# Patient Record
Sex: MALE | Race: ASIAN | NOT HISPANIC OR LATINO | Employment: UNEMPLOYED | URBAN - METROPOLITAN AREA
[De-identification: names, ages, dates, MRNs, and addresses within clinical notes are randomized per-mention and may not be internally consistent; named-entity substitution may affect disease eponyms.]

---

## 2022-08-15 ENCOUNTER — HOSPITAL ENCOUNTER (EMERGENCY)
Facility: HOSPITAL | Age: 22
Discharge: HOME | End: 2022-08-15
Admitting: NURSE PRACTITIONER
Payer: COMMERCIAL

## 2022-08-15 VITALS
HEART RATE: 75 BPM | OXYGEN SATURATION: 97 % | SYSTOLIC BLOOD PRESSURE: 99 MMHG | TEMPERATURE: 98.4 F | WEIGHT: 116 LBS | DIASTOLIC BLOOD PRESSURE: 67 MMHG | BODY MASS INDEX: 19.81 KG/M2 | RESPIRATION RATE: 16 BRPM | HEIGHT: 64 IN

## 2022-08-15 DIAGNOSIS — H72.92 EAR DRUM PERFORATION, LEFT: ICD-10-CM

## 2022-08-15 DIAGNOSIS — H92.02 OTALGIA, LEFT: ICD-10-CM

## 2022-08-15 PROCEDURE — 99283 EMERGENCY DEPT VISIT LOW MDM: CPT

## 2022-08-15 RX ORDER — OFLOXACIN 3 MG/ML
5 SOLUTION AURICULAR (OTIC) 2 TIMES DAILY
Qty: 4 ML | Refills: 0 | Status: SHIPPED | OUTPATIENT
Start: 2022-08-15 | End: 2022-08-22

## 2022-08-15 ASSESSMENT — ENCOUNTER SYMPTOMS
FEVER: 0
CHILLS: 0
SORE THROAT: 0

## 2022-08-15 ASSESSMENT — ACTIVITIES OF DAILY LIVING (ADL): ADLS_ACUITY_SCORE: 35

## 2022-08-16 NOTE — DISCHARGE INSTRUCTIONS
There is a hole in your left ear drum.    Use the antibiotic drops as prescribed.    Keep water out of your left ear. No more Q tips.    Follow up with ENT for ongoing care. Call as soon as possible to schedule this appointment.

## 2022-08-16 NOTE — ED TRIAGE NOTES
Pt had has intermittent L ear pain, itchiness, and drainage over the past 2 months. Pt comes in today d/t pain.

## 2022-08-16 NOTE — ED PROVIDER NOTES
EMERGENCY DEPARTMENT ENCOUNTER      NAME: Sindy Castrejon  AGE: 22 year old male  YOB: 2000  MRN: [id number]  EVALUATION DATE & TIME: 8/15/2022 10:31 PM    PCP: No primary care provider on file.    ED PROVIDER: ROBERT Hayes, CNP      Chief Complaint   Patient presents with     Otalgia         FINAL IMPRESSION:  1. Otalgia, left    2. Ear drum perforation, left          ED COURSE & MEDICAL DECISION MAKING:    10:36 PM I met with the patient, obtained history, performed an initial exam, and discussed options and plan for treatment here in the ED.    Pertinent Labs & Imaging studies reviewed. (See chart for details)  22 year old male presents to the Emergency Department for evaluation of left ear pain. Pain for several months. Has large perforation of the left TM on exam. Does use Q tips so potentially from trauma. Noting appears grossly infected. Will start ofloxacin drops and refer to ENT. Advised to keep water out of the left ear. No more Q tips. Also provided with return precautions.     At the conclusion of the encounter I discussed the results of all of the tests and the disposition. The questions were answered. The patient or family acknowledged understanding and was agreeable with the care plan.       PPE worn: n95 mask, goggles.     MEDICATIONS GIVEN IN THE EMERGENCY:  Medications - No data to display    NEW PRESCRIPTIONS STARTED AT TODAY'S ER VISIT  New Prescriptions    OFLOXACIN (FLOXIN) 0.3 % OTIC SOLUTION    Place 5 drops Into the left ear 2 times daily for 7 days            =================================================================    HPI    Patient information was obtained from: patient     Use of Intrepreter: N/A       Sindy Castrejon is a 22 year old male otherwise healthy who presents for evaluation of ear pain.    Patient reports left ear pain which began 4 months ago. Reports occasional yellow drainage and bleeding from the left ear. Symptoms have been ongoing, presents to  "the ED tonight due to increased pain. Patient frequently cleans his ears with Q-tips. No right ear pain or drainage. No fever, chills, sore throat, or any other complaints at this time. Denies any issues with his ears prior to 4 months ago. Does not take any medications nor did he try any meds for his pain. No other complaints or concerns expressed at this time.      REVIEW OF SYSTEMS   Review of Systems   Constitutional: Negative for chills and fever.   HENT: Positive for ear discharge (left) and ear pain (left). Negative for sore throat.    All other systems reviewed and are negative.       PAST MEDICAL HISTORY:  History reviewed. No pertinent past medical history.    PAST SURGICAL HISTORY:  History reviewed. No pertinent surgical history.        CURRENT MEDICATIONS:    No current facility-administered medications for this encounter.     Current Outpatient Medications   Medication     ofloxacin (FLOXIN) 0.3 % otic solution         ALLERGIES:  No Known Allergies    FAMILY HISTORY:  History reviewed. No pertinent family history.    SOCIAL HISTORY:   Social History     Socioeconomic History     Marital status: Single     Spouse name: None     Number of children: None     Years of education: None     Highest education level: None         VITALS:  Patient Vitals for the past 24 hrs:   BP Temp Pulse Resp SpO2 Height Weight   08/15/22 2229 110/67 98.4  F (36.9  C) 75 16 100 % 1.626 m (5' 4\") 52.6 kg (116 lb)       PHYSICAL EXAM    Constitutional:  Well developed, well nourished, no acute distress  EYES: Conjunctivae clear  HENT:  Atraumatic, normocephalic. Right TM and auditory canal appear normal. Left auditory canal is normal appearing. Large inferior TM perforation on the left. No drainage.   Respiratory:  No respiratory distress  Integument: Warm, Dry  Neurologic:  Alert & oriented x 3              Chris MARTINEZ, am serving as a scribe to document services personally performed by Earl Reis CNP. based on my " observation and the provider's statements to me. I, Earl Reis CNP attest that Chris Cavanaugh is acting in a scribe capacity, has observed my performance of the services and has documented them in accordance with my direction.    ROBERT Hayes, CNP  Emergency Medicine  [facility] EMERGENCY DEPARTMENT  [address] [place] [id number]  [phone]  Dept: [phone]         Earl Reis APRN CNP  08/15/22 9064

## 2022-10-03 ENCOUNTER — HEALTH MAINTENANCE LETTER (OUTPATIENT)
Age: 22
End: 2022-10-03

## 2022-10-17 DIAGNOSIS — H69.90 DYSFUNCTION OF EUSTACHIAN TUBE, UNSPECIFIED LATERALITY: Primary | ICD-10-CM

## 2023-10-22 ENCOUNTER — HEALTH MAINTENANCE LETTER (OUTPATIENT)
Age: 23
End: 2023-10-22

## 2024-04-11 ENCOUNTER — HOSPITAL ENCOUNTER (EMERGENCY)
Facility: HOSPITAL | Age: 24
Discharge: HOME | End: 2024-04-11
Attending: STUDENT IN AN ORGANIZED HEALTH CARE EDUCATION/TRAINING PROGRAM | Admitting: STUDENT IN AN ORGANIZED HEALTH CARE EDUCATION/TRAINING PROGRAM
Payer: COMMERCIAL

## 2024-04-11 VITALS
SYSTOLIC BLOOD PRESSURE: 116 MMHG | BODY MASS INDEX: 21.03 KG/M2 | DIASTOLIC BLOOD PRESSURE: 75 MMHG | TEMPERATURE: 98.3 F | RESPIRATION RATE: 18 BRPM | WEIGHT: 122.5 LBS | OXYGEN SATURATION: 100 % | HEART RATE: 75 BPM

## 2024-04-11 DIAGNOSIS — K01.1 IMPACTED MOLAR: ICD-10-CM

## 2024-04-11 DIAGNOSIS — K08.89 PAIN, DENTAL: ICD-10-CM

## 2024-04-11 PROCEDURE — 64400 NJX AA&/STRD TRIGEMINAL NRV: CPT

## 2024-04-11 PROCEDURE — 250N000009 HC RX 250

## 2024-04-11 PROCEDURE — 99283 EMERGENCY DEPT VISIT LOW MDM: CPT | Mod: 25

## 2024-04-11 RX ORDER — BUPIVACAINE HYDROCHLORIDE AND EPINEPHRINE 5; 5 MG/ML; UG/ML
1.8 INJECTION, SOLUTION PERINEURAL ONCE
Status: COMPLETED | OUTPATIENT
Start: 2024-04-11 | End: 2024-04-11

## 2024-04-11 RX ORDER — PENICILLIN V POTASSIUM 500 MG/1
500 TABLET, FILM COATED ORAL 4 TIMES DAILY
Qty: 40 TABLET | Refills: 0 | Status: SHIPPED | OUTPATIENT
Start: 2024-04-11 | End: 2024-04-21

## 2024-04-11 RX ADMIN — BUPIVACAINE HYDROCHLORIDE AND EPINEPHRINE BITARTRATE 1.8 ML: 5; .005 INJECTION, SOLUTION SUBCUTANEOUS at 17:05

## 2024-04-11 ASSESSMENT — ACTIVITIES OF DAILY LIVING (ADL): ADLS_ACUITY_SCORE: 35

## 2024-04-11 ASSESSMENT — COLUMBIA-SUICIDE SEVERITY RATING SCALE - C-SSRS
1. IN THE PAST MONTH, HAVE YOU WISHED YOU WERE DEAD OR WISHED YOU COULD GO TO SLEEP AND NOT WAKE UP?: NO
2. HAVE YOU ACTUALLY HAD ANY THOUGHTS OF KILLING YOURSELF IN THE PAST MONTH?: NO
6. HAVE YOU EVER DONE ANYTHING, STARTED TO DO ANYTHING, OR PREPARED TO DO ANYTHING TO END YOUR LIFE?: NO

## 2024-04-11 NOTE — ED TRIAGE NOTES
Pt reports left sided dental pain for two days. Says it hurts when he chews and is worse around [phone].      Triage Assessment (Adult)       Row Name 04/11/24 2144          Triage Assessment    Airway WDL WDL        Respiratory WDL    Respiratory WDL WDL        Skin Circulation/Temperature WDL    Skin Circulation/Temperature WDL WDL        Cardiac WDL    Cardiac WDL WDL        Peripheral/Neurovascular WDL    Peripheral Neurovascular WDL WDL        Cognitive/Neuro/Behavioral WDL    Cognitive/Neuro/Behavioral WDL WDL

## 2024-04-11 NOTE — ED PROVIDER NOTES
Emergency Department Encounter   NAME: Sindy Castrejon ; AGE: 23 year old male ; YOB: 2000 ; MRN: [id number] ; PCP: No Ref-Primary, Physician   ED PROVIDER: Audrey Acosta PA-C    Evaluation Date & Time:   4/11/2024  4:39 PM    CHIEF COMPLAINT:  Dental Pain      IMPRESSION AND PLAN   MDM: Sindy Castrejon is a 23 year old male with no pertinent history who presents to the ED by car for evaluation of dental pain.  Patient reports that 2 days ago he began to experience this dental pain, denies trauma or other inciting event previous to the onset of pain.  Patient notes chewing, swallowing, opening his mouth aggravate the pain.  He denies radiation of pain.  He reports that he has been taking ibuprofen for pain management, most recently this morning, without relief.  Patient does not follow-up regularly with a dentist.  Patient denies associated symptoms such as fever, chills, cough, chest pain, shortness of breath, diarrhea, constipation, abdominal pain, urinary changes.  Patient denies history of dental/oral surgeries.    Vitals stable. On exam patient is well-appearing.  Oral exam is notable for gingival tissue swelling on the left side of the mouth, covering his most posterior molar.  No broken teeth or dental caries noted.  No pharyngeal erythema or irritation noted.  Uvula is midline.  No mental or neck swelling.  No pain on palpation to mandible, left ear.  No overlying left-sided skin changes on the face or neck.  Shared decision-making was used to determine that no further lab work or imaging was needed at this time.  Symptoms and work up most consistent with impacted wisdom tooth. I considered, but think unlikely, Luis Carlos's angina, peritonsillar abscess, dental abscess, broken tooth, cavities because of reassuring physical exam. Patient was given a dental block for symptoms. Upon reevaluation, patient noticed immediate pain relief.    Patient discharged in improved condition but instructed to return  to the emergency department with any new or worsening of symptoms. Patient was discharged with antibiotic prescription. Patient was educated on dental health and provided informational resources as well as local dental clinics. Patient expressed understanding, feels comfortable, and is in agreement with this plan. All questions addressed prior to discharge.    Patient seen in conjunction with Dr. Mark.    History:  Supplemental history from: Documented in chart  External Record(s) reviewed: Documented in chart    Work Up:  Chart documentation includes differential considered and any EKGs or imaging independently interpreted by provider, where specified.  In additional to work up documented, I considered the following work up: Documented in chart, if applicable.    External consultation:  Discussion of management with another provider: Documented in chart, if applicable    Complicating factors:  Care impacted by chronic illness: N/A  Care affected by social determinants of health: N/A    Disposition considerations: Discharge. No recommendations on prescription strength medication(s). See documentation for any additional details.    Chart Review: N/A    ED COURSE:  4:41 PM I met and introduced myself to the patient. I gathered initial history and performed my physical exam. We discussed plan for initial workup.   4:59 PM I have staffed the patient with Dr. Mark, ED MD, who has evaluated the patient and agrees with all aspects of today's care.   5:22 PM I rechecked the patient and discussed results, discharge, follow up, and reasons to return to the ED.   ED Course as of 04/11/24 1723   Thu Apr 11, 2024   1717 24 y/o M who presents with dental pain. On exam he appears to have some swollen gum near his most posterior left lower molar. No fluctuance to suggest abscess. Exam/presentation not suggestive of PTA, orlando's, retropharyngeal abscess, epiglottitis or other deeper space infection warranting CT imaging at  this time. He accepted a dental block with some improvement in pain and will start on penicillin for possible component of apical abscess/infection. Given low cost dental clinic list and recommended follow up as soon as possible. Strict return precautions given.         At the conclusion of the encounter I discussed the results of all the tests and the disposition. The questions were answered. The patient or family acknowledged understanding and was agreeable with the care plan.    FINAL IMPRESSION:    ICD-10-CM    1. Impacted molar  K01.1       2. Pain, dental  K08.89             MEDICATIONS GIVEN IN THE EMERGENCY DEPARTMENT:  Medications   BUPivacaine 0.5 % EPINEPHrine 1:200,000 dental injection SOLN 1.8 mL (1.8 mLs Intradermal $Given 4/11/24 6686)         NEW PRESCRIPTIONS STARTED AT TODAY'S ED VISIT:  There are no discharge medications for this patient.        HPI   Patient information was obtained from: Patient   Use of Intrepreter: N/A     Sindy Castrejon is a 23 year old male with no pertinent history who presents to the ED by car for evaluation of dental pain.  Patient reports that 2 days ago he began to experience this dental pain, denies trauma or other inciting event previous to the onset of pain.  Patient notes chewing, swallowing, opening his mouth aggravate the pain.  He denies radiation of pain.  He reports that he has been taking ibuprofen for pain management, most recently this morning, without relief.  Patient does not follow-up regularly with a dentist.  Patient denies associated symptoms such as fever, chills, cough, chest pain, shortness of breath, diarrhea, constipation, abdominal pain, urinary changes.      REVIEW OF SYSTEMS:  Pertinent positive and negative symptoms per HPI.       MEDICAL HISTORY     No past medical history on file.    No past surgical history on file.    No family history on file.         penicillin V (VEETID) 500 MG tablet          PHYSICAL EXAM     First Vitals:  Patient  Vitals for the past 24 hrs:   BP Temp Temp src Pulse Resp SpO2 Weight   04/11/24 1716 116/75 -- -- 75 -- 100 % --   04/11/24 1617 104/74 98.3  F (36.8  C) Oral 75 18 97 % 55.6 kg (122 lb 8 oz)       PHYSICAL EXAM:  General Appearance:  Alert, cooperative, no distress, appears stated age  HENT: Normocephalic without obvious deformity, atraumatic. Mucous membranes moist.  Oral exam notable for left-sided gingival swelling over the most posterior molar.  Uvula is midline.  No pharyngeal erythema or irritation noted.  No masses or drainage or signs of abscess.  Eyes: Conjunctiva clear, lids normal. No discharge.   Musculoskeletal: Moving all extremities. No gross deformities  Skin: Warm, dry, no rashes or lesions  Neurologic: Alert and orientated x3. No focal deficits.  Psych: Normal mood and affect      RESULTS     LAB:  All pertinent labs reviewed and interpreted  Labs Ordered and Resulted from Time of ED Arrival to Time of ED Departure - No data to display    RADIOLOGY:  No orders to display         PROCEDURES:  PROCEDURE: Dental Nerve Block   INDICATIONS: Dental pain   PROCEDURE PROVIDER: Audrey Acosta PA-C   SITE: Nerve block of 3rd and 2nd molars to achieve anesthesia of Tooth #38, 37     MEDICATION: 5 mL of 0.5% Bupivacaine with epinephrine   NOTE: The usual mandibular landmarks were identified and the needle was positioned at point where the gingiva meets buccal mucosa.  I injected the medication into the site.    COMPLICATIONS: Patient tolerated procedure well, without complication       Audrey Acosta PA-C  Emergency Medicine   [facility] EMERGENCY DEPARTMENT       Audrey Acosta PA-C  04/11/24 5797

## 2024-04-11 NOTE — ED PROVIDER NOTES
.Emergency Department Midlevel Supervisory Note     I had a face to face encounter with this patient seen by the Advanced Practice Provider (REHAN). I personally made/approved the management plan and take responsibility for the patient management. I personally saw patient and performed a substantive portion of the visit including all aspects of the medical decision making.     ED Course:  4:41 PM  Audrey Acosta PA-C  staffed patient with me. I agree with their assessment and plan of management, and I will see the patient.  5:03 PM I met with the patient to introduce myself, gather additional history, perform my initial exam, and discuss the plan. Audrey Acosta PA-C performed dental block on patient.    Brief HPI:     Sindy Castrejon is a 23 year old male who presents for evaluation of 2 days of left-sided dental pain that worsens with chewing.    I, Mel Giordano, am serving as a scribe to document services personally performed by Dr. Kari Mark based on my observation and the provider's statements to me. I, Kari Mark MD attest that Mel Giordano is acting in a scribe capacity, has observed my performance of the services and has documented them in accordance with my direction.    Brief Physical Exam: /75   Pulse 75   Temp 98.3  F (36.8  C) (Oral)   Resp 18   Wt 55.6 kg (122 lb 8 oz)   SpO2 100%   BMI 21.03 kg/m    Constitutional: Well developed, well nourished. Comfortable appearing  HENT: Normocephalic, atraumatic, mucous membranes moist, nose normal  Mouth: left lower most posterior molar is tender to percussion with some swelling and tenderness to the gums near by, no palpable fluctuance, good dentition throughout, other teeth are nontender, oropharynx clear without erythema, swelling or masses. Floor of mouth is soft. No trismus or muffled voice. Neck without tenderness or swelling.  Eyes: Pupils mid range, sclera white, no discharge  Neck- Gross ROM intact.   Respiratory: Normal work of  breathing, normal rate, speaks in full sentences  Cardiovascular: Normal heart rate  Musculoskeletal: Moving all 4 extremities intentionally and without pain.  Neurologic: Alert & oriented, cranial nerves grossly intact. Speech clear. No focal deficits noted    MDM:  ED Course as of 04/11/24 1730   Thu Apr 11, 2024   1717 24 y/o M who presents with dental pain. On exam he appears to have some swollen gum near his most posterior left lower molar. No fluctuance to suggest abscess. Exam/presentation not suggestive of PTA, orlando's, retropharyngeal abscess, epiglottitis or other deeper space infection warranting CT imaging at this time. He accepted a dental block with some improvement in pain and will start on penicillin for possible component of apical abscess/infection. Given low cost dental clinic list and recommended follow up as soon as possible. Strict return precautions given.          1. Impacted molar    2. Pain, dental        Consults:  N/A    Labs and Imaging:  N/A    EKG: I reviewed and independently interpreted the patient's EKG, with comments made as listed below. Please see scanned EKG for full report.   N/A    Procedures:  I was present for the key portions of procedures documented in REHAN/midlevel note, see midlevel note for further details.    Kari Mark M.D.  [facility] EMERGENCY DEPARTMENT  [address] [place] [id number] [phone]      Kari Mark MD  04/11/24 8451

## 2024-04-11 NOTE — DISCHARGE INSTRUCTIONS
Provided list of available dental clinics in the [facility]. Walk-ins available and no insurance is needed. Please take complete prescription of antibiotics, even if symptoms improve.

## 2024-12-15 ENCOUNTER — HEALTH MAINTENANCE LETTER (OUTPATIENT)
Age: 24
End: 2024-12-15